# Patient Record
Sex: MALE | Race: ASIAN | Employment: OTHER | URBAN - METROPOLITAN AREA
[De-identification: names, ages, dates, MRNs, and addresses within clinical notes are randomized per-mention and may not be internally consistent; named-entity substitution may affect disease eponyms.]

---

## 2017-11-26 ENCOUNTER — TRANSFERRED RECORDS (OUTPATIENT)
Dept: HEALTH INFORMATION MANAGEMENT | Facility: CLINIC | Age: 37
End: 2017-11-26

## 2017-11-27 ENCOUNTER — TRANSFERRED RECORDS (OUTPATIENT)
Dept: HEALTH INFORMATION MANAGEMENT | Facility: CLINIC | Age: 37
End: 2017-11-27

## 2017-11-28 ENCOUNTER — HOSPITAL ENCOUNTER (EMERGENCY)
Facility: CLINIC | Age: 37
Discharge: HOME | End: 2017-11-28
Attending: EMERGENCY MEDICINE | Admitting: EMERGENCY MEDICINE
Payer: COMMERCIAL

## 2017-11-28 ENCOUNTER — TRANSFERRED RECORDS (OUTPATIENT)
Dept: HEALTH INFORMATION MANAGEMENT | Facility: CLINIC | Age: 37
End: 2017-11-28

## 2017-11-28 VITALS
DIASTOLIC BLOOD PRESSURE: 62 MMHG | BODY MASS INDEX: 34.07 KG/M2 | HEIGHT: 69 IN | SYSTOLIC BLOOD PRESSURE: 125 MMHG | WEIGHT: 230 LBS | TEMPERATURE: 98 F | RESPIRATION RATE: 18 BRPM | OXYGEN SATURATION: 96 % | HEART RATE: 70 BPM

## 2017-11-28 DIAGNOSIS — R07.9 ACUTE CHEST PAIN: ICD-10-CM

## 2017-11-28 LAB
ANION GAP SERPL CALCULATED.3IONS-SCNC: 10 MMOL/L (ref 3–14)
BASOPHILS # BLD AUTO: 0.1 10E9/L (ref 0–0.2)
BASOPHILS NFR BLD AUTO: 0.8 %
BUN SERPL-MCNC: 14 MG/DL (ref 7–30)
CALCIUM SERPL-MCNC: 8.7 MG/DL (ref 8.5–10.1)
CHLORIDE SERPL-SCNC: 102 MMOL/L (ref 94–109)
CO2 SERPL-SCNC: 25 MMOL/L (ref 20–32)
CREAT SERPL-MCNC: 0.67 MG/DL (ref 0.66–1.25)
DIFFERENTIAL METHOD BLD: ABNORMAL
EOSINOPHIL # BLD AUTO: 0.3 10E9/L (ref 0–0.7)
EOSINOPHIL NFR BLD AUTO: 2.2 %
ERYTHROCYTE [DISTWIDTH] IN BLOOD BY AUTOMATED COUNT: 13 % (ref 10–15)
GFR SERPL CREATININE-BSD FRML MDRD: >90 ML/MIN/1.7M2
GLUCOSE SERPL-MCNC: 94 MG/DL (ref 70–99)
HCT VFR BLD AUTO: 46.8 % (ref 40–53)
HGB BLD-MCNC: 16.3 G/DL (ref 13.3–17.7)
IMM GRANULOCYTES # BLD: 0.1 10E9/L (ref 0–0.4)
IMM GRANULOCYTES NFR BLD: 0.8 %
INTERPRETATION ECG - MUSE: NORMAL
LYMPHOCYTES # BLD AUTO: 3.2 10E9/L (ref 0.8–5.3)
LYMPHOCYTES NFR BLD AUTO: 25.4 %
MCH RBC QN AUTO: 29.5 PG (ref 26.5–33)
MCHC RBC AUTO-ENTMCNC: 34.8 G/DL (ref 31.5–36.5)
MCV RBC AUTO: 85 FL (ref 78–100)
MONOCYTES # BLD AUTO: 0.9 10E9/L (ref 0–1.3)
MONOCYTES NFR BLD AUTO: 7 %
NEUTROPHILS # BLD AUTO: 8 10E9/L (ref 1.6–8.3)
NEUTROPHILS NFR BLD AUTO: 63.8 %
NRBC # BLD AUTO: 0 10*3/UL
NRBC BLD AUTO-RTO: 0 /100
PLATELET # BLD AUTO: 298 10E9/L (ref 150–450)
POTASSIUM SERPL-SCNC: 3.8 MMOL/L (ref 3.4–5.3)
RBC # BLD AUTO: 5.53 10E12/L (ref 4.4–5.9)
SODIUM SERPL-SCNC: 137 MMOL/L (ref 133–144)
TROPONIN I BLD-MCNC: 0 UG/L (ref 0–0.1)
TROPONIN I SERPL-MCNC: <0.015 UG/L (ref 0–0.04)
TROPONIN I SERPL-MCNC: <0.015 UG/L (ref 0–0.04)
WBC # BLD AUTO: 12.5 10E9/L (ref 4–11)

## 2017-11-28 PROCEDURE — 85025 COMPLETE CBC W/AUTO DIFF WBC: CPT | Performed by: EMERGENCY MEDICINE

## 2017-11-28 PROCEDURE — 93005 ELECTROCARDIOGRAM TRACING: CPT

## 2017-11-28 PROCEDURE — 99284 EMERGENCY DEPT VISIT MOD MDM: CPT

## 2017-11-28 PROCEDURE — 84484 ASSAY OF TROPONIN QUANT: CPT | Performed by: EMERGENCY MEDICINE

## 2017-11-28 PROCEDURE — 84484 ASSAY OF TROPONIN QUANT: CPT

## 2017-11-28 PROCEDURE — 80048 BASIC METABOLIC PNL TOTAL CA: CPT | Performed by: EMERGENCY MEDICINE

## 2017-11-28 ASSESSMENT — ENCOUNTER SYMPTOMS
COUGH: 1
NAUSEA: 0
VOMITING: 0
FEVER: 0

## 2017-11-28 NOTE — ED AVS SNAPSHOT
[facility] Emergency Department    [address]    [facility] [address]    Phone:  [phone]    Fax:  [phone]                                       Santo Gore   MRN: [id number]    Department:  [facility] Emergency Department   Date of Visit:  11/28/2017           After Visit Summary Signature Page     I have received my discharge instructions, and my questions have been answered. I have discussed any challenges I see with this plan with the nurse or doctor.    ..........................................................................................................................................  Patient/Patient Representative Signature      ..........................................................................................................................................  Patient Representative Print Name and Relationship to Patient    ..................................................               ................................................  Date                                            Time    ..........................................................................................................................................  Reviewed by Signature/Title    ...................................................              ..............................................  Date                                                            Time

## 2017-11-28 NOTE — ED NOTES
Bed: ED26  Expected date: 11/28/17  Expected time: 1:16 PM  Means of arrival: Ambulance  Comments:  BV3  38yo chest pain

## 2017-11-28 NOTE — ED NOTES
Patient presents via EMS with chest pressure and EKG changes from [facility]. Patient reports that the pain started about 1 week ago. He was seen at urgent care thinking he had cold symptoms as he has also had a cough. Patient reports he is SOB with this and actually had a friend ask him about his change in breathing lately.

## 2017-11-28 NOTE — ED AVS SNAPSHOT
[facility] Emergency Department    [address]    [facility] [address]    Phone:  [phone]    Fax:  [phone]                                       Santo Gore   MRN: [id number]    Department:  [facility] Emergency Department   Date of Visit:  11/28/2017           Patient Information     Date Of Birth          1980        Your diagnoses for this visit were:     Acute chest pain        You were seen by Kelsey Vaughn MD.      Follow-up Information     Follow up with [facility]. Schedule an appointment as soon as possible for a visit in 1 week.    Specialty:  Family Medicine    Why:  to establish primary care    Contact information:    [address] [place] [id number] [phone]        Follow up with [facility] Emergency Department.    Specialty:  EMERGENCY MEDICINE    Why:  If symptoms worsen    Contact information:    [address] [facility] [address] [phone]        Go to to follow up.        Discharge Instructions       Discharge Instructions  Chest Pain    You have been seen today for chest pain or discomfort.  At this time, your provider has found no signs that your chest pain is due to a serious or life-threatening condition, (or you have declined more testing and/or admission to the hospital). However, sometimes there is a serious problem that does not show up right away. Your evaluation today may not be complete and you may need further testing and evaluation.     Generally, every Emergency Department visit should have a follow-up clinic visit with either a primary or a specialty clinic/provider. Please follow-up as instructed by your emergency provider today.  Return to the Emergency Department if:    Your chest pain changes, gets worse, starts to happen more often, or comes with less activity.    You are newly short of breath.    You get very weak or tired.    You  pass out or faint.    You have any new symptoms, like fever, cough, numb legs, or you cough up blood.    You have anything else that worries you.    Until you follow-up with your regular provider, please do the following:    Take one aspirin daily unless you have an allergy or are told not to by your provider.    If a stress test appointment has been made, go to the appointment.    If you have questions, contact your regular provider.    Follow-up with your regular provider/clinic as directed; this is very important.    If you were given a prescription for medicine here today, be sure to read all of the information (including the package insert) that comes with your prescription.  This will include important information about the medicine, its side effects, and any warnings that you need to know about.  The pharmacist who fills the prescription can provide more information and answer questions you may have about the medicine.  If you have questions or concerns that the pharmacist cannot address, please call or return to the Emergency Department.       Remember that you can always come back to the Emergency Department if you are not able to see your regular provider in the amount of time listed above, if you get any new symptoms, or if there is anything that worries you.      24 Hour Appointment Hotline       To make an appointment at any [facility], call [phone] ([phone]). If you don't have a family doctor or clinic, we will help you find one. [place] clinics are conveniently located to serve the needs of you and your family.          ED Discharge Orders     Exercise Stress Echocardiogram       Administration of IV contrast will be tailored to this examination per the appropriate written protocol listed in the Echocardiography department Protocol Book, or by the supervising Cardiologist. This may result in an order change.    Use of contrast is at the discretion of the supervising Cardiologist.             Follow-Up with Cardiologist                    Review of your medicines      Our records show that you are taking the medicines listed below. If these are incorrect, please call your family doctor or clinic.        Dose / Directions Last dose taken    HYDROcodone-acetaminophen 5-325 MG per tablet   Commonly known as:  NORCO   Dose:  1-2 tablet   Quantity:  20 tablet        Take 1-2 tablets by mouth every 4 hours as needed for pain (Moderate to Severe Pain).   Refills:  0        sildenafil 100 MG tablet   Commonly known as:  VIAGRA   Dose:  100 mg   Quantity:  6 tablet        Take 1 tablet by mouth daily as needed for erectile dysfunction. Take 30 min to 4 hours before intercourse.  Never use with nitroglycerin, terazosin or doxazosin.   Refills:  1                Procedures and tests performed during your visit     Procedure/Test Number of Times Performed    Basic metabolic panel 1    CBC with platelets differential 1    EKG 12 lead 1    ISTAT troponin nursing POCT 1    Troponin I 2    Troponin POCT 1      Orders Needing Specimen Collection     None      Pending Results     No orders found from 11/26/2017 to 11/29/2017.            Pending Culture Results     No orders found from 11/26/2017 to 11/29/2017.            Pending Results Instructions     If you had any lab results that were not finalized at the time of your Discharge, you can call the ED Lab Result RN at [phone]. You will be contacted by this team for any positive Lab results or changes in treatment. The nurses are available 7 days a week from 10A to 6:30P.  You can leave a message 24 hours per day and they will return your call.        Test Results From Your Hospital Stay        11/28/2017  2:49 PM      Component Results     Component Value Ref Range & Units Status    WBC 12.5 (H) 4.0 - 11.0 10e9/L Final    RBC Count 5.53 4.4 - 5.9 10e12/L Final    Hemoglobin 16.3 13.3 - 17.7 g/dL Final    Hematocrit 46.8 40.0 - 53.0 % Final    MCV 85 78 -  100 fl Final    MCH 29.5 26.5 - 33.0 pg Final    MCHC 34.8 31.5 - 36.5 g/dL Final    RDW 13.0 10.0 - 15.0 % Final    Platelet Count 298 150 - 450 10e9/L Final    Diff Method Automated Method  Final    % Neutrophils 63.8 % Final    % Lymphocytes 25.4 % Final    % Monocytes 7.0 % Final    % Eosinophils 2.2 % Final    % Basophils 0.8 % Final    % Immature Granulocytes 0.8 % Final    Nucleated RBCs 0 0 /100 Final    Absolute Neutrophil 8.0 1.6 - 8.3 10e9/L Final    Absolute Lymphocytes 3.2 0.8 - 5.3 10e9/L Final    Absolute Monocytes 0.9 0.0 - 1.3 10e9/L Final    Absolute Eosinophils 0.3 0.0 - 0.7 10e9/L Final    Absolute Basophils 0.1 0.0 - 0.2 10e9/L Final    Abs Immature Granulocytes 0.1 0 - 0.4 10e9/L Final    Absolute Nucleated RBC 0.0  Final         11/28/2017  3:11 PM      Component Results     Component Value Ref Range & Units Status    Sodium 137 133 - 144 mmol/L Final    Potassium 3.8 3.4 - 5.3 mmol/L Final    Chloride 102 94 - 109 mmol/L Final    Carbon Dioxide 25 20 - 32 mmol/L Final    Anion Gap 10 3 - 14 mmol/L Final    Glucose 94 70 - 99 mg/dL Final    Urea Nitrogen 14 7 - 30 mg/dL Final    Creatinine 0.67 0.66 - 1.25 mg/dL Final    GFR Estimate >90 >60 mL/min/1.7m2 Final    Non  GFR Calc    GFR Estimate If Black >90 >60 mL/min/1.7m2 Final    African American GFR Calc    Calcium 8.7 8.5 - 10.1 mg/dL Final         11/28/2017  3:13 PM      Component Results     Component Value Ref Range & Units Status    Troponin I ES <0.015 0.000 - 0.045 ug/L Final    The 99th percentile for upper reference range is 0.045 ug/L.  Troponin values   in the range of 0.045 - 0.120 ug/L may be associated with risks of adverse   clinical events.           11/28/2017  2:16 PM      Component Results     Component Value Ref Range & Units Status    Troponin I 0.00 0.00 - 0.10 ug/L Final         11/28/2017  5:28 PM      Component Results     Component Value Ref Range & Units Status    Troponin I ES <0.015 0.000 - 0.045  ug/L Final    The 99th percentile for upper reference range is 0.045 ug/L.  Troponin values   in the range of 0.045 - 0.120 ug/L may be associated with risks of adverse   clinical events.                  Clinical Quality Measure: Blood Pressure Screening     Your blood pressure was checked while you were in the emergency department today. The last reading we obtained was  BP: 125/62 . Please read the guidelines below about what these numbers mean and what you should do about them.  If your systolic blood pressure (the top number) is less than 120 and your diastolic blood pressure (the bottom number) is less than 80, then your blood pressure is normal. There is nothing more that you need to do about it.  If your systolic blood pressure (the top number) is 120-139 or your diastolic blood pressure (the bottom number) is 80-89, your blood pressure may be higher than it should be. You should have your blood pressure rechecked within a year by a primary care provider.  If your systolic blood pressure (the top number) is 140 or greater or your diastolic blood pressure (the bottom number) is 90 or greater, you may have high blood pressure. High blood pressure is treatable, but if left untreated over time it can put you at risk for heart attack, stroke, or kidney failure. You should have your blood pressure rechecked by a primary care provider within the next 4 weeks.  If your provider in the emergency department today gave you specific instructions to follow-up with your doctor or provider even sooner than that, you should follow that instruction and not wait for up to 4 weeks for your follow-up visit.        Thank you for choosing [facility]       Thank you for choosing [facility] for your care. Our goal is always to provide you with excellent care. Hearing back from our patients is one way we can continue to improve our services. Please take a few minutes to complete the written survey that you may receive in the mail after  "you visit with us. Thank you!        [employer] Information     [employer] lets you send messages to your doctor, view your test results, renew your prescriptions, schedule appointments and more. To sign up, go to [email or web address] . Click on \"Log in\" on the left side of the screen, which will take you to the Welcome page. Then click on \"Sign up Now\" on the right side of the page.     You will be asked to enter the access code listed below, as well as some personal information. Please follow the directions to create your username and password.     Your access code is: [email or web address]  Expires: 2018  5:50 PM     Your access code will  in 90 days. If you need help or a new code, please call your [facility] clinic or [phone].        Care EveryWhere ID     This is your Care EveryWhere ID. This could be used by other organizations to access your [facility] medical records  [id number]        Equal Access to Services     JOANNE ROJAS : Hadii tiffanie love hadasho Socoltenali, waaxda luqadaha, qaybta kaalmada adegalyalinwood, mervin welch . So [facility] [place] [phone].    ATENCIÓN: Si habla español, tiene a baron disposición servicios gratuitos de asistencia lingüística. Llame al [phone].    We comply with applicable federal civil rights laws and [place] laws. We do not discriminate on the basis of race, color, national origin, age, disability, sex, sexual orientation, or gender identity.            After Visit Summary       This is your record. Keep this with you and show to your community pharmacist(s) and doctor(s) at your next visit.                  "

## 2017-11-30 ENCOUNTER — RADIANT APPOINTMENT (OUTPATIENT)
Dept: GENERAL RADIOLOGY | Facility: CLINIC | Age: 37
End: 2017-11-30
Attending: PHYSICIAN ASSISTANT
Payer: COMMERCIAL

## 2017-11-30 ENCOUNTER — OFFICE VISIT (OUTPATIENT)
Dept: FAMILY MEDICINE | Facility: CLINIC | Age: 37
End: 2017-11-30
Payer: COMMERCIAL

## 2017-11-30 VITALS
HEIGHT: 69 IN | TEMPERATURE: 97 F | OXYGEN SATURATION: 97 % | SYSTOLIC BLOOD PRESSURE: 130 MMHG | WEIGHT: 233 LBS | HEART RATE: 103 BPM | DIASTOLIC BLOOD PRESSURE: 84 MMHG | BODY MASS INDEX: 34.51 KG/M2

## 2017-11-30 DIAGNOSIS — E78.2 MIXED HYPERLIPIDEMIA: ICD-10-CM

## 2017-11-30 DIAGNOSIS — R94.31 ABNORMAL ELECTROCARDIOGRAM: ICD-10-CM

## 2017-11-30 DIAGNOSIS — E66.811 OBESITY, CLASS I, BMI 30.0-34.9 (SEE ACTUAL BMI): ICD-10-CM

## 2017-11-30 DIAGNOSIS — R07.89 ATYPICAL CHEST PAIN: Primary | ICD-10-CM

## 2017-11-30 DIAGNOSIS — R07.89 ATYPICAL CHEST PAIN: ICD-10-CM

## 2017-11-30 DIAGNOSIS — Z72.0 TOBACCO ABUSE: ICD-10-CM

## 2017-11-30 PROCEDURE — 99204 OFFICE O/P NEW MOD 45 MIN: CPT | Performed by: PHYSICIAN ASSISTANT

## 2017-11-30 PROCEDURE — 71020 XR CHEST 2 VW: CPT

## 2017-11-30 RX ORDER — VARENICLINE TARTRATE 1 MG/1
1 TABLET, FILM COATED ORAL 2 TIMES DAILY
Qty: 56 TABLET | Refills: 2 | Status: SHIPPED | OUTPATIENT
Start: 2017-11-30

## 2017-11-30 RX ORDER — AZITHROMYCIN 1 G/1
1 POWDER, FOR SUSPENSION ORAL
COMMUNITY
End: 2017-11-30

## 2017-11-30 RX ORDER — ALBUTEROL SULFATE 90 UG/1
2 AEROSOL, METERED RESPIRATORY (INHALATION) EVERY 6 HOURS PRN
Qty: 1 INHALER | Refills: 0 | COMMUNITY
Start: 2017-11-30

## 2017-11-30 NOTE — PROGRESS NOTES
Santo  Here are your recent results.  They are normal.  If you have any questions please do not hesitate to contact our office via phone ([phone]) or MyChart.    Deborah Santillan MS, PA-C  [facility]

## 2017-11-30 NOTE — NURSING NOTE
"Chief Complaint   Patient presents with     RECHECK     Urgent care f/u for chest pain. need referall to cardiologist. wants rx for chantix.        Initial /84  Pulse 103  Temp 97  F (36.1  C) (Tympanic)  Ht 5' 8.5\" (1.74 m)  Wt 233 lb (105.7 kg)  SpO2 97%  BMI 34.91 kg/m2 Estimated body mass index is 34.91 kg/(m^2) as calculated from the following:    Height as of this encounter: 5' 8.5\" (1.74 m).    Weight as of this encounter: 233 lb (105.7 kg).  Medication Reconciliation: complete   Padma Ma, OBINNA      "

## 2017-11-30 NOTE — PROGRESS NOTES
SUBJECTIVE:   Santo Gore is a 37 year old male who presents to clinic today for the following health issues:    New Patient/Transfer of Care    Santo presents the clinic today with the chief complaint of intermittent angina for the past week. The episodes last from 10 minutes to several hours and have no obvious trigger or precipitating or alleviating factor. On 11/28/17 Santo reported to the [facility] ED for acute chest pain and bilateral hand numbness after moving heavy furniture- XR normal. Symptoms persisted the next day, so on 11/29/17 he visited  where he had a concerning EKG and was sent to ED via EMS. aSnto was pain-free at the ED with last episode of pain prior to arrival. Troponin x 3 and BMP normal. CBC: WBC: 12.5(H) o/w WNL. (HGB 16.3, ). No hx of reflux. No personal or family hx of DVT. Positive FMHx for MI. PGMo with stroke.  Advised to f/u with PCP with outpatient stress test.    Santo reports a stressful few weeks- had to move out of his property last week (and was lifting heavy furniture), is amidst a custody bernal for his child who cannot leave [facility], and has a few pending lawsuits. Even with this information he states his stress is manageable. 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    Smoking  Santo is a 12 year total smoker at 1/3 to 1/2 ppd and would like to quit. He once quit for 10 years after a tonsillectomy but started again about 2 years ago during a divorce.    Pneumonia  Chronic cough/history of asthma per patient (await report of spirometry from [facility]). On 11/26/17 Santo visited [facility] and was diagnosed with possible pneumonia. They sent him home with Zithromax and an inhaler. Today is his last day of zithromax, cough improved.     Problem list and histories reviewed & adjusted, as indicated.  Additional history: as documented    Patient Active Problem List   Diagnosis     Erectile dysfunction     Male circumcision     Panic attack      Obesity, Class I, BMI 30.0-34.9 (see actual BMI)     Tobacco abuse     Mixed hyperlipidemia     Past Surgical History:   Procedure Laterality Date     ALVEOLOPLASTY  2013    severe overbite     CIRCUMCISION  5/30/2013    Procedure: CIRCUMCISION;  CIRCUMCISION;  Surgeon: Terence Agrawal MD;  Location: [facility] OR     TONSILLECTOMY       wisdom teeth         Social History   Substance Use Topics     Smoking status: Current Every Day Smoker     Last attempt to quit: 1/1/2003     Smokeless tobacco: Never Used      Comment: 12 total years (as of 11/2017)     Alcohol use 4.8 oz/week     8 Standard drinks or equivalent per week      Comment: occas     Family History   Problem Relation Age of Onset     CANCER Father      Alzheimer Disease Maternal Grandmother      DIABETES Paternal Grandmother      Angina Other      Angina Maternal Uncle          Current Outpatient Prescriptions   Medication Sig Dispense Refill     albuterol (PROAIR HFA/PROVENTIL HFA/VENTOLIN HFA) 108 (90 BASE) MCG/ACT Inhaler Inhale 2 puffs into the lungs every 6 hours as needed for shortness of breath / dyspnea or wheezing 1 Inhaler 0     varenicline (CHANTIX STARTING MONTH PAK) 0.5 MG X 11 & 1 MG X 42 tablet Take 0.5 mg tab daily for 3 days, then 0.5 mg tab twice daily for 4 days, then 1 mg twice daily. 53 tablet 0     varenicline (CHANTIX) 1 MG tablet Take 1 tablet (1 mg) by mouth 2 times daily 56 tablet 2     Allergies   Allergen Reactions     No Clinical Screening - See Comments      PN: LW Other1: -cats, pollen, grass, dogs, rabbits,       Reviewed and updated as needed this visit by clinical staff  Tobacco  Allergies  Meds  Problems  Med Hx  Surg Hx  Fam Hx  Soc Hx        Reviewed and updated as needed this visit by Provider  Tobacco  Allergies  Meds  Problems  Med Hx  Surg Hx  Fam Hx  Soc Hx        ROS:  Constitutional, HEENT, cardiovascular, pulmonary, GI, , musculoskeletal, neuro, skin, endocrine and psych systems are negative,  "except as otherwise noted.    This document serves as a record of the services and decisions personally performed and made by Deborah Santillan PA-C. It was created on her behalf by Domingo Cruz, a trained medical scribe. The creation of this document is based the provider's statements to the medical scribe.  Domingo Cruz, November 30, 2017 12:05 PM    OBJECTIVE:   /84  Pulse 103  Temp 97  F (36.1  C) (Tympanic)  Ht 5' 8.5\" (1.74 m)  Wt 233 lb (105.7 kg)  SpO2 97%  BMI 34.91 kg/m2  Body mass index is 34.91 kg/(m^2).     GENERAL: healthy, alert and no distress  EYES: Eyes grossly normal to inspection, PERRL and conjunctivae and sclerae normal  HENT: ear canals and TM's normal, nose and mouth without ulcers or lesions  NECK: no adenopathy, no asymmetry, masses, or scars and thyroid normal to palpation  RESP: lungs clear to auscultation - no rales, rhonchi or wheezes  CV: regular rate and rhythm, normal S1 S2, no S3 or S4, no murmur, click or rub, no peripheral edema and peripheral pulses strong  ABDOMEN: soft, nontender, no hepatosplenomegaly, no masses and bowel sounds normal  MS: no gross musculoskeletal defects noted, no edema  SKIN: no suspicious lesions or rashes  NEURO: Normal strength and tone, mentation intact and speech normal  PSYCH: mentation appears normal, affect normal/bright    Diagnostic Test Results:   Recent Results (from the past 744 hour(s))   XR Chest 2 Views    Narrative    CHEST TWO VIEWS  11/30/2017 12:36 PM    HISTORY:  Atypical chest pain.    COMPARISON:  None.      Impression    IMPRESSION:  Negative.     DEBBIE AMAYA MD         ASSESSMENT/PLAN:   Santo was seen today for recheck.    Diagnoses and all orders for this visit:    Atypical chest pain; Abnormal electrocardiogram  Concern of underlying cardiac issues.  Dr. Nagy cc'd on chart to assist in facilitating scheduling.  -     Exercise Stress Echocardiogram; Future  -     Echocardiogram Complete; Future  -     " Comprehensive metabolic panel; Future  -     TSH with free T4 reflex; Future  -     CBC with platelets; Future  -     XR Chest 2 Views; Future  -     CARDIOLOGY EVAL ADULT REFERRAL    Mixed hyperlipidemia; Obesity, Class I, BMI 30.0-34.9 (see actual BMI)  Encouraged weight loss.  Future labs.   -     Exercise Stress Echocardiogram; Future  -     Echocardiogram Complete; Future  -     Lipid panel reflex to direct LDL Fasting; Future  -     CARDIOLOGY EVAL ADULT REFERRAL    Tobacco abuse  Trial of Chantix.  Pt desires cessation.  Recent chest pain episode scared him.  Discussed how patient is responsible for setting a quit date for 2 weeks after med start.  Keep us informed of any adverse reactions with medication.  -     Exercise Stress Echocardiogram; Future  -     Echocardiogram Complete; Future  -     varenicline (CHANTIX STARTING MONTH PAK) 0.5 MG X 11 & 1 MG X 42 tablet; Take 0.5 mg tab daily for 3 days, then 0.5 mg tab twice daily for 4 days, then 1 mg twice daily.  -     varenicline (CHANTIX) 1 MG tablet; Take 1 tablet (1 mg) by mouth 2 times daily  -     CARDIOLOGY EVAL ADULT REFERRAL    FOLLOW UP: with cardiology and for fasting labs/echo & stress test.    The information in this document, created by the medical scribe for me, accurately reflects the services I personally performed and the decisions made by me. I have reviewed and approved this document for accuracy prior to leaving the patient care area.  12:23 PM, 11/30/17    Deborah Santillan PA-C  [facility]

## 2017-11-30 NOTE — Clinical Note
Dr. Nagy - This patient is scheduled for a exercise stress test due to abnormal EKG and chest pain.  troponins were in the ED.  CAD risk factors include tobacco use, hyperlipidemia, family history (details unknown), obesity.    Can you get him scheduled with you in [place] after his stress test?  Do you want any other diagnostic studies?  He will also be doing fasting labs soon.  Deborah Santillan, MS, PA-C [facility] [place]

## 2017-11-30 NOTE — MR AVS SNAPSHOT
After Visit Summary   11/30/2017    Santo Gore    MRN: [id number]           Patient Information     Date Of Birth          1980        Visit Information        Provider Department      11/30/2017 11:40 AM Deborah Santillan PA-C [facility]        Today's Diagnoses     Atypical chest pain    -  1    Abnormal electrocardiogram        Mixed hyperlipidemia        Tobacco abuse        Obesity, Class I, BMI 30.0-34.9 (see actual BMI)           Follow-ups after your visit        Additional Services     CARDIOLOGY EVAL ADULT REFERRAL       Your provider has referred you to:  [facility]: [facility] [phone]   [email or web address]    Please be aware that coverage of these services is subject to the terms and limitations of your health insurance plan.  Call member services at your health plan with any benefit or coverage questions.      Type of Referral:  New Cardiology Consult    Timeframe requested:  Within 1 month    Please bring the following to your appointment:  >>   Any x-rays, CTs or MRIs which have been performed.  Contact the facility where they were done to arrange for  prior to your scheduled appointment.    >>   List of current medications  >>   This referral request   >>   Any documents/labs given to you for this referral                  Your next 10 appointments already scheduled     Nov 30, 2017 12:20 PM CST   (Arrive by 12:05 PM)   XR CHEST 2 VIEWS with RVXR1   [facility]    [address] [place] [id number] [phone]           Please bring a list of your current medicines to your exam. (Include vitamins, minerals and over-thecounter medicines.) Leave your valuables at home.  Tell your doctor if there is a chance you may be pregnant.  You do not need to do anything special for this exam.            Dec  05, 2017  2:00 PM CST   Ech Stress Test with RHSTRESS   [facility] [place] ([facility])    [address]  [facility] [id number] [phone]           1. Please bring or wear a comfortable two-piece outfit and walking shoes. 2. Stop eating 3 hours before the test. You may drink water or juice. 3. Stop all caffeine 12 hours before the test. This includes coffee, tea, soda pop, chocolate and certain medicines (such as Anacin and Excederin). Also avoid decaf coffee and tea, as these contain small amounts of caffeine. 4. No alcohol, smoking or use of other tobacco products for 12 hours before the test. 5. Refer to your provider instructions to see if you need to stop any medications (such as beta-blockers or nitrates) for this test. 6. For patients with diabetes: - If you take insulin, call your diabetes care team. Ask if you should take a   dose the morning of your test. - If you take diabetes medicine by mouth, dont take it on the morning of your test. Bring it with you to take after the test. (If you have questions, call your diabetes care team) 7. When you arrive, please tell us if: - You have diabetes. - You have taken Viagra, Cialis or Levitra in the past 48 hours. 8. For any questions that cannot be answered, please contact the ordering physician              Future tests that were ordered for you today     Open Future Orders        Priority Expected Expires Ordered    Lipid panel reflex to direct LDL Fasting Routine  2/28/2018 11/30/2017    Comprehensive metabolic panel Routine  2/28/2018 11/30/2017    TSH with free T4 reflex Routine  2/28/2018 11/30/2017    CBC with platelets Routine  2/28/2018 11/30/2017    Exercise Stress Echocardiogram Routine  11/30/2018 11/30/2017    Echocardiogram Complete Routine  11/30/2018 11/30/2017            Who to contact     If you have questions or need follow up information about today's clinic visit or your schedule please contact [place]  [facility] directly at [phone].  Normal or non-critical lab and imaging results will be communicated to you by MyChart, letter or phone within 4 business days after the clinic has received the results. If you do not hear from us within 7 days, please contact the clinic through [employer] or phone. If you have a critical or abnormal lab result, we will notify you by phone as soon as possible.  Submit refill requests through [employer] or call your pharmacy and they will forward the refill request to us. Please allow 3 business days for your refill to be completed.          Additional Information About Your Visit        [employer] Information     [employer] lets you send messages to your doctor, view your test results, renew your prescriptions, schedule appointments and more. To sign up, go to [email or web address] . Click on \"Log in\" on the left side of the screen, which will take you to the Welcome page. Then click on \"Sign up Now\" on the right side of the page.     You will be asked to enter the access code listed below, as well as some personal information. Please follow the directions to create your username and password.     Your access code is: [email or web address]  Expires: 2018  5:50 PM     Your access code will  in 90 days. If you need help or a new code, please call your [place] clinic or [phone].        Care EveryWhere ID     This is your Care EveryWhere ID. This could be used by other organizations to access your [place] medical records  [id number]        Your Vitals Were     Pulse Temperature Height Pulse Oximetry BMI (Body Mass Index)       103 97  F (36.1  C) (Tympanic) 5' 8.5\" (1.74 m) 97% 34.91 kg/m2        Blood Pressure from Last 3 Encounters:   17 130/84   17 125/62   13 116/76    Weight from Last 3 Encounters:   17 233 lb (105.7 kg)   17 230 lb (104.3 kg)   13 206 lb (93.4 kg)              We Performed the Following     CARDIOLOGY EVAL ADULT " REFERRAL          Today's Medication Changes          These changes are accurate as of: 11/30/17 12:19 PM.  If you have any questions, ask your nurse or doctor.               Start taking these medicines.        Dose/Directions    * varenicline 0.5 MG X 11 & 1 MG X 42 tablet   Commonly known as:  CHANTIX STARTING MONTH PAK   Used for:  Tobacco abuse   Started by:  Deborah Santillan PA-C        Take 0.5 mg tab daily for 3 days, then 0.5 mg tab twice daily for 4 days, then 1 mg twice daily.   Quantity:  53 tablet   Refills:  0       * varenicline 1 MG tablet   Commonly known as:  CHANTIX   Used for:  Tobacco abuse   Started by:  Deborah Santillan PA-C        Dose:  1 mg   Take 1 tablet (1 mg) by mouth 2 times daily   Quantity:  56 tablet   Refills:  2       * Notice:  This list has 2 medication(s) that are the same as other medications prescribed for you. Read the directions carefully, and ask your doctor or other care provider to review them with you.         Where to get your medicines      [facility] [address] [place] [address] AT [facility] [address] [address]    Hours:  24-hours Phone:  [phone]     varenicline 0.5 MG X 11 & 1 MG X 42 tablet    varenicline 1 MG tablet                Primary Care Provider Office Phone # Fax #    Deborah Santillan PA-C [phone]       [address] [address]        Equal Access to Services     BETO ROJAS AH: Hadii tiffanie ku hadasho Soomaali, waaxda luqadaha, qaybta kaalmada adeegyada, mervin ididomingo ascencio. So [facility] [phone].    ATENCIÓN: Si habla español, tiene a baron disposición servicios gratuitos de asistencia lingüística. Brennan al [phone].    We comply with applicable federal civil rights laws and [place] laws. We do not discriminate on the basis of race, color, national origin, age,  disability, sex, sexual orientation, or gender identity.            Thank you!     Thank you for choosing [facility]  for your care. Our goal is always to provide you with excellent care. Hearing back from our patients is one way we can continue to improve our services. Please take a few minutes to complete the written survey that you may receive in the mail after your visit with us. Thank you!             Your Updated Medication List - Protect others around you: Learn how to safely use, store and throw away your medicines at www.disposemymeds.org.          This list is accurate as of: 11/30/17 12:19 PM.  Always use your most recent med list.                   Brand Name Dispense Instructions for use Diagnosis    albuterol 108 (90 BASE) MCG/ACT Inhaler    PROAIR HFA/PROVENTIL HFA/VENTOLIN HFA    1 Inhaler    Inhale 2 puffs into the lungs every 6 hours as needed for shortness of breath / dyspnea or wheezing        * varenicline 0.5 MG X 11 & 1 MG X 42 tablet    CHANTIX STARTING MONTH PINKY    53 tablet    Take 0.5 mg tab daily for 3 days, then 0.5 mg tab twice daily for 4 days, then 1 mg twice daily.    Tobacco abuse       * varenicline 1 MG tablet    CHANTIX    56 tablet    Take 1 tablet (1 mg) by mouth 2 times daily    Tobacco abuse       * Notice:  This list has 2 medication(s) that are the same as other medications prescribed for you. Read the directions carefully, and ask your doctor or other care provider to review them with you.

## 2017-12-01 PROBLEM — R07.89 ATYPICAL CHEST PAIN: Status: ACTIVE | Noted: 2017-12-01

## 2017-12-01 PROBLEM — R94.31 ABNORMAL ELECTROCARDIOGRAM: Status: ACTIVE | Noted: 2017-12-01

## 2017-12-05 ENCOUNTER — HOSPITAL ENCOUNTER (OUTPATIENT)
Dept: CARDIOLOGY | Facility: CLINIC | Age: 37
Discharge: HOME | End: 2017-12-05
Attending: PHYSICIAN ASSISTANT | Admitting: PHYSICIAN ASSISTANT
Payer: COMMERCIAL

## 2017-12-05 ENCOUNTER — HOSPITAL ENCOUNTER (OUTPATIENT)
Dept: CARDIOLOGY | Facility: CLINIC | Age: 37
Discharge: HOME | End: 2017-12-05
Attending: EMERGENCY MEDICINE | Admitting: EMERGENCY MEDICINE
Payer: COMMERCIAL

## 2017-12-05 ENCOUNTER — HOSPITAL ENCOUNTER (OUTPATIENT)
Dept: LAB | Facility: CLINIC | Age: 37
End: 2017-12-05
Attending: PHYSICIAN ASSISTANT
Payer: COMMERCIAL

## 2017-12-05 DIAGNOSIS — E78.2 MIXED HYPERLIPIDEMIA: ICD-10-CM

## 2017-12-05 DIAGNOSIS — R07.89 ATYPICAL CHEST PAIN: ICD-10-CM

## 2017-12-05 DIAGNOSIS — R94.31 ABNORMAL ELECTROCARDIOGRAM: ICD-10-CM

## 2017-12-05 DIAGNOSIS — R07.9 ACUTE CHEST PAIN: ICD-10-CM

## 2017-12-05 DIAGNOSIS — Z72.0 TOBACCO ABUSE: ICD-10-CM

## 2017-12-05 DIAGNOSIS — E66.811 OBESITY, CLASS I, BMI 30.0-34.9 (SEE ACTUAL BMI): ICD-10-CM

## 2017-12-05 LAB
ALBUMIN SERPL-MCNC: 4.3 G/DL (ref 3.4–5)
ALP SERPL-CCNC: 84 U/L (ref 40–150)
ALT SERPL W P-5'-P-CCNC: 40 U/L (ref 0–70)
ANION GAP SERPL CALCULATED.3IONS-SCNC: 13 MMOL/L (ref 3–14)
AST SERPL W P-5'-P-CCNC: 20 U/L (ref 0–45)
BILIRUB SERPL-MCNC: 0.5 MG/DL (ref 0.2–1.3)
BUN SERPL-MCNC: 13 MG/DL (ref 7–30)
CALCIUM SERPL-MCNC: 9.1 MG/DL (ref 8.5–10.1)
CHLORIDE SERPL-SCNC: 103 MMOL/L (ref 94–109)
CHOLEST SERPL-MCNC: 180 MG/DL
CO2 SERPL-SCNC: 22 MMOL/L (ref 20–32)
CREAT SERPL-MCNC: 0.89 MG/DL (ref 0.66–1.25)
ERYTHROCYTE [DISTWIDTH] IN BLOOD BY AUTOMATED COUNT: 13.1 % (ref 10–15)
GFR SERPL CREATININE-BSD FRML MDRD: >90 ML/MIN/1.7M2
GLUCOSE SERPL-MCNC: 96 MG/DL (ref 70–99)
HCT VFR BLD AUTO: 50.1 % (ref 40–53)
HDLC SERPL-MCNC: 42 MG/DL
HGB BLD-MCNC: 17.1 G/DL (ref 13.3–17.7)
LDLC SERPL CALC-MCNC: ABNORMAL MG/DL
LDLC SERPL DIRECT ASSAY-MCNC: 92 MG/DL
MCH RBC QN AUTO: 29.3 PG (ref 26.5–33)
MCHC RBC AUTO-ENTMCNC: 34.1 G/DL (ref 31.5–36.5)
MCV RBC AUTO: 86 FL (ref 78–100)
NONHDLC SERPL-MCNC: 138 MG/DL
PLATELET # BLD AUTO: 364 10E9/L (ref 150–450)
POTASSIUM SERPL-SCNC: 4.1 MMOL/L (ref 3.4–5.3)
PROT SERPL-MCNC: 8.8 G/DL (ref 6.8–8.8)
RBC # BLD AUTO: 5.84 10E12/L (ref 4.4–5.9)
SODIUM SERPL-SCNC: 138 MMOL/L (ref 133–144)
TRIGL SERPL-MCNC: 473 MG/DL
TSH SERPL DL<=0.005 MIU/L-ACNC: 1.3 MU/L (ref 0.4–4)
WBC # BLD AUTO: 12.3 10E9/L (ref 4–11)

## 2017-12-05 PROCEDURE — 93306 TTE W/DOPPLER COMPLETE: CPT | Mod: 26 | Performed by: INTERNAL MEDICINE

## 2017-12-05 PROCEDURE — 84443 ASSAY THYROID STIM HORMONE: CPT | Performed by: PHYSICIAN ASSISTANT

## 2017-12-05 PROCEDURE — 93016 CV STRESS TEST SUPVJ ONLY: CPT | Performed by: INTERNAL MEDICINE

## 2017-12-05 PROCEDURE — 93325 DOPPLER ECHO COLOR FLOW MAPG: CPT | Mod: 26 | Performed by: INTERNAL MEDICINE

## 2017-12-05 PROCEDURE — 40000264 ECHO STRESS WITH OPTISON

## 2017-12-05 PROCEDURE — 93018 CV STRESS TEST I&R ONLY: CPT | Performed by: INTERNAL MEDICINE

## 2017-12-05 PROCEDURE — 85027 COMPLETE CBC AUTOMATED: CPT | Performed by: PHYSICIAN ASSISTANT

## 2017-12-05 PROCEDURE — 83721 ASSAY OF BLOOD LIPOPROTEIN: CPT | Performed by: PHYSICIAN ASSISTANT

## 2017-12-05 PROCEDURE — 25500064 ZZH RX 255 OP 636: Performed by: PHYSICIAN ASSISTANT

## 2017-12-05 PROCEDURE — 93350 STRESS TTE ONLY: CPT | Mod: 26 | Performed by: INTERNAL MEDICINE

## 2017-12-05 PROCEDURE — 25500064 ZZH RX 255 OP 636: Performed by: EMERGENCY MEDICINE

## 2017-12-05 PROCEDURE — 40000264 ECHO COMPLETE WITH OPTISON

## 2017-12-05 PROCEDURE — 36415 COLL VENOUS BLD VENIPUNCTURE: CPT | Performed by: PHYSICIAN ASSISTANT

## 2017-12-05 PROCEDURE — 93321 DOPPLER ECHO F-UP/LMTD STD: CPT | Mod: 26 | Performed by: INTERNAL MEDICINE

## 2017-12-05 PROCEDURE — 80061 LIPID PANEL: CPT | Performed by: PHYSICIAN ASSISTANT

## 2017-12-05 PROCEDURE — 80053 COMPREHEN METABOLIC PANEL: CPT | Performed by: PHYSICIAN ASSISTANT

## 2017-12-05 RX ADMIN — HUMAN ALBUMIN MICROSPHERES AND PERFLUTREN 3 ML: 10; .22 INJECTION, SOLUTION INTRAVENOUS at 10:30

## 2017-12-05 RX ADMIN — HUMAN ALBUMIN MICROSPHERES AND PERFLUTREN 6 ML: 10; .22 INJECTION, SOLUTION INTRAVENOUS at 11:02

## 2017-12-05 NOTE — PROGRESS NOTES
Santo  Here are your recent results.  Your echocardiogram results show some mild enlargement of the left side of the heart but your overall function is fairly good.  This will be reviewed in detail by cardiology on 12/7.   If you have any questions please do not hesitate to contact our office via phone ([phone]) or MyChart.    Deborah Santillan MS, PAJuanC  [facility]

## 2017-12-06 NOTE — PROGRESS NOTES
Santo  Here are your recent results.    Your cholesterol is quite high.  The cardiologist will likely start you on a medication when you see him tomorrow.  Your white blood cell count is very modestly elevated but this is likely from the chest infection/walking pneumonia infection that you were treated for recently.  We can recheck this in a few months to make sure it decreases/normalizes.  -Thyroid function and kidney/liver function are normal.    If you have any questions please do not hesitate to contact our office via phone ([phone]) or MyChart.    Deborah Santillan, MS, PA-C  [facility]

## 2017-12-07 ENCOUNTER — OFFICE VISIT (OUTPATIENT)
Dept: CARDIOLOGY | Facility: CLINIC | Age: 37
End: 2017-12-07
Attending: PHYSICIAN ASSISTANT
Payer: COMMERCIAL

## 2017-12-07 VITALS
HEIGHT: 68 IN | WEIGHT: 234 LBS | BODY MASS INDEX: 35.46 KG/M2 | DIASTOLIC BLOOD PRESSURE: 68 MMHG | SYSTOLIC BLOOD PRESSURE: 122 MMHG | HEART RATE: 76 BPM

## 2017-12-07 DIAGNOSIS — E66.811 OBESITY, CLASS I, BMI 30.0-34.9 (SEE ACTUAL BMI): ICD-10-CM

## 2017-12-07 DIAGNOSIS — Z72.0 TOBACCO ABUSE: ICD-10-CM

## 2017-12-07 DIAGNOSIS — E78.1 HYPERTRIGLYCERIDEMIA: ICD-10-CM

## 2017-12-07 DIAGNOSIS — R07.89 ATYPICAL CHEST PAIN: Primary | ICD-10-CM

## 2017-12-07 PROCEDURE — 99204 OFFICE O/P NEW MOD 45 MIN: CPT | Performed by: INTERNAL MEDICINE

## 2017-12-07 NOTE — MR AVS SNAPSHOT
"              After Visit Summary   12/7/2017    Santo Gore    MRN: [id number]           Patient Information     Date Of Birth          1980        Visit Information        Provider Department      12/7/2017 11:15 AM John Paul Ferrell MD [facility]   [place]         Follow-ups after your visit        Who to contact     If you have questions or need follow up information about today's clinic visit or your schedule please contact [facility]   [place] directly at [phone].  Normal or non-critical lab and imaging results will be communicated to you by [employer], letter or phone within 4 business days after the clinic has received the results. If you do not hear from us within 7 days, please contact the clinic through [employer] or phone. If you have a critical or abnormal lab result, we will notify you by phone as soon as possible.  Submit refill requests through PPS or call your pharmacy and they will forward the refill request to us. Please allow 3 business days for your refill to be completed.          Additional Information About Your Visit        MyChart Information     PPS gives you secure access to your electronic health record. If you see a primary care provider, you can also send messages to your care team and make appointments. If you have questions, please call your primary care clinic.  If you do not have a primary care provider, please call [phone] and they will assist you.        Care EveryWhere ID     This is your Care EveryWhere ID. This could be used by other organizations to access your [place] medical records  [id number]        Your Vitals Were     Pulse Height BMI (Body Mass Index)             76 1.727 m (5' 8\") 35.58 kg/m2          Blood Pressure from Last 3 Encounters:   12/07/17 122/68   11/30/17 130/84   11/28/17 125/62    Weight from Last 3 Encounters:   12/07/17 106.1 kg (234 lb)   11/30/17 105.7 kg (233 lb) "   11/28/17 104.3 kg (230 lb)              Today, you had the following     No orders found for display       Primary Care Provider Office Phone # Fax #    Deborah Santillan PA-C [phone]       [address] [address]        Equal Access to Services     BETO ROJAS : Hadii aad ku hadasho Soomaali, waaxda luqadaha, qaybta kaalmada adeegyada, waxay idiin hayaan adeeg kharash la'aan . So [facility] [phone].    ATENCIÓN: Si habla español, tiene a baron disposición servicios gratuitos de asistencia lingüística. Brennan al [phone].    We comply with applicable federal civil rights laws and [place] laws. We do not discriminate on the basis of race, color, national origin, age, disability, sex, sexual orientation, or gender identity.            Thank you!     Thank you for choosing [facility]  for your care. Our goal is always to provide you with excellent care. Hearing back from our patients is one way we can continue to improve our services. Please take a few minutes to complete the written survey that you may receive in the mail after your visit with us. Thank you!             Your Updated Medication List - Protect others around you: Learn how to safely use, store and throw away your medicines at www.disposemymeds.org.          This list is accurate as of: 12/7/17 11:59 AM.  Always use your most recent med list.                   Brand Name Dispense Instructions for use Diagnosis    albuterol 108 (90 BASE) MCG/ACT Inhaler    PROAIR HFA/PROVENTIL HFA/VENTOLIN HFA    1 Inhaler    Inhale 2 puffs into the lungs every 6 hours as needed for shortness of breath / dyspnea or wheezing        * varenicline 0.5 MG X 11 & 1 MG X 42 tablet    CHANTIX STARTING MONTH PINKY    53 tablet    Take 0.5 mg tab daily for 3 days, then 0.5 mg tab twice daily for 4 days, then 1 mg twice daily.    Tobacco abuse       * varenicline 1 MG tablet     CHANTIX    56 tablet    Take 1 tablet (1 mg) by mouth 2 times daily    Tobacco abuse       * Notice:  This list has 2 medication(s) that are the same as other medications prescribed for you. Read the directions carefully, and ask your doctor or other care provider to review them with you.

## 2017-12-07 NOTE — PROGRESS NOTES
HISTORY OF PRESENT ILLNESS:  I had the pleasure of seeing your patient, Santo Gore, at [facility] for evaluation of atypical chest pain.  The patient states that he was moving his domicile around Thanksgiving of this year.  He awoke and noted some hand tingling and chest discomfort.  This discomfort came and went and did not seem to radiate.  He went to urgent care and back and chest x-rays were performed.  Because of a cough, he was given a Z-Jaiden.  When the chest discomfort persisted, he went to urgent care again and because of an abnormal EKG was taken to [facility] in an ambulance.  In the emergency room, he ruled out for myocardial infarction.  I reviewed the patient's EKG with him from 11/28/2017.  This demonstrated poor R-wave progression across the precordium as well as Q-waves in III and aVF.  There was also diffuse J point elevation but no [place] depression.  The patient was set up for an echo and a stress echo which were performed on 12/05.  His stress echocardiogram was normal with the patient exercising 10 minutes and 48 seconds to a maximal heart rate of 171 beats per minute.  Blood pressure was normal.  There was no evidence of arrhythmias other than occasional PVCs.  He had no chest discomfort or shortness of breath.  There were no wall motion abnormalities.  Echocardiogram performed the same day demonstrated normal heart valves.  The reader decided that this patient had borderline to mild concentric left ventricular hypertrophy.  There was borderline right atrial enlargement.  In fact, I suspect that there was no hypertrophy at all.  The patient has never had a myocardial infarction.  He has known hypertriglyceridemia with triglycerides of 473 on 12/05.  TSH was normal at 1.3.  Electrolytes are normal.  His creatinine was 0.89 and BUN 13.  Hemoglobin 17.1.  Troponins were normal.  His HDL was 42, total cholesterol 180, non-HDL cholesterol 138.      The patient  denies a history of diabetes mellitus.  He has not treated his hypertriglyceridemia in the past.  He has smoked for many years, generally 1/2 pack of cigarettes per day, more recently 5-6 cigarettes per day.  He is now on Chantix and planning to quit next week.  He has no history of hypertension, family history of premature atherosclerosis.  The patient has never had a myocardial infarction or stroke.  He has a membership to the gym but he does not exercise routinely.  His diet consists of rice and other carbohydrates.      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  He has a law suit against the Erika Rodriguez regarding parental rights.      PHYSICAL EXAMINATION:  As listed below.      ASSESSMENT:   1.  Santo Gore is a delightful 37-year-old male who presents with atypical chest pain and arm tingling.  I suspect that this is musculoskeletal or compression of his ulnar nerves while sleeping.  None of this suggests cardiac ischemia.  His stress echocardiogram was normal and did not provoke any chest symptoms.  I have reassured this patient.   2.  The patient has hypertriglyceridemia, probably because of diet and lack of exercise.  His glucose was normal.  I suggested that he follow up with his primary care physician after diet and exercise for 6 months for a repeat triglyceride and hemoglobin A1c.  I have fully discussed a low-carbohydrate and low-fat diet with him and given him a handout on the Mediterranean diet.  We discussed weight loss as well.   3.  Tobacco abuse.  I had a 10-minute discussion with this patient regarding his tobacco abuse.  He notes that it is currently secondary to stress.  We talked about using an exercise program to replace the stress reliever in his life.      It is my pleasure to assist in the care of Santo Gore.  All his questions were answered to his satisfaction.  It is my plan to see him again on a p.r.n. basis.      cc:   LEE Lombardi  [facility]   [address] [place] [address]  [id number]         BENITA MAS MD, [facility]             D: 2017 12:11   T: 2017 12:49   MT: dariana      Name:     MALENA GRIFFITH   MRN:      [id number]        Account:      [id number]   :      1980           Service Date: 2017      Document: [id number]

## 2017-12-07 NOTE — PROGRESS NOTES
HPI and Plan:   See dictation:[id number]    No orders of the defined types were placed in this encounter.      No orders of the defined types were placed in this encounter.      There are no discontinued medications.      No diagnosis found.    CURRENT MEDICATIONS:  Current Outpatient Prescriptions   Medication Sig Dispense Refill     albuterol (PROAIR HFA/PROVENTIL HFA/VENTOLIN HFA) 108 (90 BASE) MCG/ACT Inhaler Inhale 2 puffs into the lungs every 6 hours as needed for shortness of breath / dyspnea or wheezing 1 Inhaler 0     varenicline (CHANTIX STARTING MONTH PAK) 0.5 MG X 11 & 1 MG X 42 tablet Take 0.5 mg tab daily for 3 days, then 0.5 mg tab twice daily for 4 days, then 1 mg twice daily. 53 tablet 0     varenicline (CHANTIX) 1 MG tablet Take 1 tablet (1 mg) by mouth 2 times daily 56 tablet 2       ALLERGIES     Allergies   Allergen Reactions     No Clinical Screening - See Comments      PN: LW Other1: -cats, pollen, grass, dogs, rabbits,       PAST MEDICAL HISTORY:  Past Medical History:   Diagnosis Date     Asthma     spirometry at [facility] 11/2017 - showed abnormal results     Erectile dysfunction 5/21/2013     Hypertriglyceridemia      Obesity 5/21/2013     Smoking 5/21/2013       PAST SURGICAL HISTORY:  Past Surgical History:   Procedure Laterality Date     ALVEOLOPLASTY  2013    severe overbite     CIRCUMCISION  5/30/2013    Procedure: CIRCUMCISION;  CIRCUMCISION;  Surgeon: Terence Agrawal MD;  Location: [facility] OR     TONSILLECTOMY       wisdom teeth         FAMILY HISTORY:  Family History   Problem Relation Age of Onset     CANCER Father      Alzheimer Disease Maternal Grandmother      DIABETES Paternal Grandmother      CEREBROVASCULAR DISEASE Paternal Grandmother      Angina Other      Angina Maternal Uncle      Hyperlipidemia Brother        SOCIAL HISTORY:  Social History     Social History     Marital status:      Spouse name: N/A     Number of children: 1     Years of education: N/A  "    Occupational History     Non profit owner Self     Social History Main Topics     Smoking status: Current Every Day Smoker     Last attempt to quit: 1/1/2003     Smokeless tobacco: Never Used     Alcohol use 4.8 oz/week     8 Standard drinks or equivalent per week      Comment: occasional     Drug use: No      Comment: former marijuana     Sexual activity: Yes     Other Topics Concern     Parent/Sibling W/ Cabg, Mi Or Angioplasty Before 65f 55m? No     Social History Narrative       Review of Systems:  Skin:  Negative       Eyes:  Negative      ENT:  Negative      Respiratory:  Positive for dyspnea on exertion (asthma)     Cardiovascular:    Positive for;fatigue (discomfort at rest and activity)    Gastroenterology: Negative      Genitourinary:  not assessed      Musculoskeletal:  Negative      Neurologic:  Positive for numbness or tingling of hands    Psychiatric:  Negative      Heme/Lymph/Imm:  Negative      Endocrine:  Negative        Physical Exam:  Vitals: /68  Pulse 76  Ht 1.727 m (5' 8\")  Wt 106.1 kg (234 lb)  BMI 35.58 kg/m2    Constitutional:  cooperative, alert and oriented, well developed, well nourished, in no acute distress overweight      Skin:  warm and dry to the touch, no apparent skin lesions or masses noted          Head:  normocephalic, no masses or lesions        Eyes:  pupils equal and round, conjunctivae and lids unremarkable, sclera white, no xanthalasma, EOMS intact, no nystagmus        Lymph:      ENT:  no pallor or cyanosis, dentition good        Neck:  carotid pulses are full and equal bilaterally, JVP normal, no carotid bruit        Respiratory:  normal breath sounds, clear to auscultation, normal A-P diameter, normal symmetry, normal respiratory excursion, no use of accessory muscles         Cardiac: regular rhythm, normal S1/S2, no S3 or S4, apical impulse not displaced, no murmurs, gallops or rubs                pulses full and equal, no bruits auscultated                "                         GI:  abdomen soft, non-tender, BS normoactive, no mass, no HSM, no bruits        Extremities and Muscular Skeletal:  no deformities, clubbing, cyanosis, erythema observed;no edema              Neurological:  no gross motor deficits;affect appropriate        Psych:  Alert and Oriented x 3        CC  Deborah Santillan PA-C  [address] [address]

## 2017-12-07 NOTE — LETTER
12/7/2017    Deborah Santillan PA-C  [address] [address]    RE: Santo Gore       Dear Colleague,    I had the pleasure of seeing Santo Gore in the [facility].    HISTORY OF PRESENT ILLNESS:  I had the pleasure of seeing your patient, Santo Gore, at [facility] for evaluation of atypical chest pain.  The patient states that he was moving his domicile around [facility] of this year.  He awoke and noted some hand tingling and chest discomfort.  This discomfort came and went and did not seem to radiate.  He went to urgent care and back and chest x-rays were performed.  Because of a cough, he was given a Z-Jaiden.  When the chest discomfort persisted, he went to urgent care again and because of an abnormal EKG was taken to [facility] in an ambulance.  In the emergency room, he ruled out for myocardial infarction.  I reviewed the patient's EKG with him from 11/28/2017.  This demonstrated poor R-wave progression across the precordium as well as Q-waves in III and aVF.  There was also diffuse J point elevation but no MO depression.  The patient was set up for an echo and a stress echo which were performed on 12/05.  His stress echocardiogram was normal with the patient exercising 10 minutes and 48 seconds to a maximal heart rate of 171 beats per minute.  Blood pressure was normal.  There was no evidence of arrhythmias other than occasional PVCs.  He had no chest discomfort or shortness of breath.  There were no wall motion abnormalities.  Echocardiogram performed the same day demonstrated normal heart valves.  The reader decided that this patient had borderline to mild concentric left ventricular hypertrophy.  There was borderline right atrial enlargement.  In fact, I suspect that there was no hypertrophy at all.  The patient has never had a myocardial infarction.  He has known hypertriglyceridemia  with triglycerides of 473 on 12/05.  TSH was normal at 1.3.  Electrolytes are normal.  His creatinine was 0.89 and BUN 13.  Hemoglobin 17.1.  Troponins were normal.  His HDL was 42, total cholesterol 180, non-HDL cholesterol 138.      The patient denies a history of diabetes mellitus.  He has not treated his hypertriglyceridemia in the past.  He has smoked for many years, generally 1/2 pack of cigarettes per day, more recently 5-6 cigarettes per day.  He is now on Chantix and planning to quit next week.  He has no history of hypertension, family history of premature atherosclerosis.  The patient has never had a myocardial infarction or stroke.  He has a membership to the gym but he does not exercise routinely.  His diet consists of rice and other carbohydrates.      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  He has a law suit against the [employer] regarding parental rights.      PHYSICAL EXAMINATION:  As listed below.      Outpatient Encounter Prescriptions as of 12/7/2017   Medication Sig Dispense Refill     albuterol (PROAIR HFA/PROVENTIL HFA/VENTOLIN HFA) 108 (90 BASE) MCG/ACT Inhaler Inhale 2 puffs into the lungs every 6 hours as needed for shortness of breath / dyspnea or wheezing 1 Inhaler 0     varenicline (CHANTIX STARTING MONTH PAK) 0.5 MG X 11 & 1 MG X 42 tablet Take 0.5 mg tab daily for 3 days, then 0.5 mg tab twice daily for 4 days, then 1 mg twice daily. 53 tablet 0     varenicline (CHANTIX) 1 MG tablet Take 1 tablet (1 mg) by mouth 2 times daily 56 tablet 2     No facility-administered encounter medications on file as of 12/7/2017.      ASSESSMENT:   1.  Santo Gore is a delightful 37-year-old male who presents with atypical chest pain and arm tingling.  I suspect that this is musculoskeletal or compression of his ulnar nerves while sleeping.  None of this suggests cardiac ischemia.  His stress echocardiogram was normal and did not provoke any chest symptoms.  I  have reassured this patient.   2.  The patient has hypertriglyceridemia, probably because of diet and lack of exercise.  His glucose was normal.  I suggested that he follow up with his primary care physician after diet and exercise for 6 months for a repeat triglyceride and hemoglobin A1c.  I have fully discussed a low-carbohydrate and low-fat diet with him and given him a handout on the Mediterranean diet.  We discussed weight loss as well.   3.  Tobacco abuse.  I had a 10-minute discussion with this patient regarding his tobacco abuse.  He notes that it is currently secondary to stress.  We talked about using an exercise program to replace the stress reliever in his life.      It is my pleasure to assist in the care of Santo Gore.  All his questions were answered to his satisfaction.  It is my plan to see him again on a p.r.n. basis.      Sincerely,    John Paul Ferrell MD     [facility]

## 2019-12-15 ENCOUNTER — HEALTH MAINTENANCE LETTER (OUTPATIENT)
Age: 39
End: 2019-12-15

## 2021-01-15 ENCOUNTER — HEALTH MAINTENANCE LETTER (OUTPATIENT)
Age: 41
End: 2021-01-15

## 2021-10-24 ENCOUNTER — HEALTH MAINTENANCE LETTER (OUTPATIENT)
Age: 41
End: 2021-10-24

## 2022-02-13 ENCOUNTER — HEALTH MAINTENANCE LETTER (OUTPATIENT)
Age: 42
End: 2022-02-13

## 2022-10-16 ENCOUNTER — HEALTH MAINTENANCE LETTER (OUTPATIENT)
Age: 42
End: 2022-10-16

## 2023-03-26 ENCOUNTER — HEALTH MAINTENANCE LETTER (OUTPATIENT)
Age: 43
End: 2023-03-26